# Patient Record
Sex: FEMALE | Race: WHITE | NOT HISPANIC OR LATINO | URBAN - METROPOLITAN AREA
[De-identification: names, ages, dates, MRNs, and addresses within clinical notes are randomized per-mention and may not be internally consistent; named-entity substitution may affect disease eponyms.]

---

## 2024-10-07 ENCOUNTER — OFFICE VISIT (OUTPATIENT)
Dept: URGENT CARE | Age: 34
End: 2024-10-07
Payer: COMMERCIAL

## 2024-10-07 VITALS
RESPIRATION RATE: 16 BRPM | SYSTOLIC BLOOD PRESSURE: 124 MMHG | DIASTOLIC BLOOD PRESSURE: 93 MMHG | OXYGEN SATURATION: 99 % | BODY MASS INDEX: 25.61 KG/M2 | HEART RATE: 92 BPM | WEIGHT: 150 LBS | TEMPERATURE: 98.2 F | HEIGHT: 64 IN

## 2024-10-07 DIAGNOSIS — H60.331 ACUTE SWIMMER'S EAR OF RIGHT SIDE: Primary | ICD-10-CM

## 2024-10-07 RX ORDER — CIPROFLOXACIN AND DEXAMETHASONE 3; 1 MG/ML; MG/ML
4 SUSPENSION/ DROPS AURICULAR (OTIC) 2 TIMES DAILY
Qty: 7.5 ML | Refills: 0 | Status: SHIPPED | OUTPATIENT
Start: 2024-10-07 | End: 2024-10-14

## 2024-10-07 NOTE — PROGRESS NOTES
Chief Complaint   Patient presents with    Earache     Rt ear pain, muffled sounds, 4 days       Physical Exam:       RIght canal is erythematous and swollen. Exudate present. Tympanic membrane unremarkable.            Encounter Diagnosis   Name Primary?    Acute swimmer's ear of right side Yes        Plan:     Ciprodex drops     Rowan Vasquez, DO